# Patient Record
Sex: MALE | Race: ASIAN | NOT HISPANIC OR LATINO | Employment: FULL TIME | URBAN - METROPOLITAN AREA
[De-identification: names, ages, dates, MRNs, and addresses within clinical notes are randomized per-mention and may not be internally consistent; named-entity substitution may affect disease eponyms.]

---

## 2023-12-14 ENCOUNTER — APPOINTMENT (OUTPATIENT)
Dept: CT IMAGING | Facility: HOSPITAL | Age: 48
End: 2023-12-14
Payer: MEDICAID

## 2023-12-14 ENCOUNTER — HOSPITAL ENCOUNTER (EMERGENCY)
Facility: HOSPITAL | Age: 48
Discharge: HOME | End: 2023-12-14
Attending: EMERGENCY MEDICINE | Admitting: EMERGENCY MEDICINE
Payer: MEDICAID

## 2023-12-14 VITALS
OXYGEN SATURATION: 94 % | WEIGHT: 186 LBS | DIASTOLIC BLOOD PRESSURE: 96 MMHG | TEMPERATURE: 98.6 F | HEART RATE: 73 BPM | RESPIRATION RATE: 28 BRPM | SYSTOLIC BLOOD PRESSURE: 151 MMHG

## 2023-12-14 DIAGNOSIS — R07.9 CHEST PAIN: ICD-10-CM

## 2023-12-14 LAB
ALBUMIN SERPL BCG-MCNC: 4.7 G/DL (ref 3.5–5.2)
ALP SERPL-CCNC: 108 U/L (ref 40–150)
ALT SERPL W P-5'-P-CCNC: 29 U/L (ref 0–70)
ANION GAP SERPL CALCULATED.3IONS-SCNC: 9 MMOL/L (ref 7–15)
AST SERPL W P-5'-P-CCNC: 20 U/L (ref 0–45)
ATRIAL RATE - MUSE: 82 BPM
BASOPHILS # BLD AUTO: 0 10E3/UL (ref 0–0.2)
BASOPHILS NFR BLD AUTO: 0 %
BILIRUB DIRECT SERPL-MCNC: <0.2 MG/DL (ref 0–0.3)
BILIRUB SERPL-MCNC: 0.5 MG/DL
BUN SERPL-MCNC: 15.7 MG/DL (ref 6–20)
CALCIUM SERPL-MCNC: 9 MG/DL (ref 8.6–10)
CHLORIDE SERPL-SCNC: 104 MMOL/L (ref 98–107)
CREAT SERPL-MCNC: 0.85 MG/DL (ref 0.67–1.17)
D DIMER PPP FEU-MCNC: <0.27 UG/ML FEU (ref 0–0.5)
DEPRECATED HCO3 PLAS-SCNC: 27 MMOL/L (ref 22–29)
DIASTOLIC BLOOD PRESSURE - MUSE: NORMAL MMHG
EGFRCR SERPLBLD CKD-EPI 2021: >90 ML/MIN/1.73M2
EOSINOPHIL # BLD AUTO: 0.1 10E3/UL (ref 0–0.7)
EOSINOPHIL NFR BLD AUTO: 1 %
ERYTHROCYTE [DISTWIDTH] IN BLOOD BY AUTOMATED COUNT: 12.2 % (ref 10–15)
GLUCOSE SERPL-MCNC: 114 MG/DL (ref 70–99)
HCT VFR BLD AUTO: 54.9 % (ref 40–53)
HGB BLD-MCNC: 18.1 G/DL (ref 13.3–17.7)
HOLD SPECIMEN: NORMAL
HOLD SPECIMEN: NORMAL
IMM GRANULOCYTES # BLD: 0 10E3/UL
IMM GRANULOCYTES NFR BLD: 0 %
INTERPRETATION ECG - MUSE: NORMAL
LYMPHOCYTES # BLD AUTO: 2.1 10E3/UL (ref 0.8–5.3)
LYMPHOCYTES NFR BLD AUTO: 26 %
MCH RBC QN AUTO: 30.1 PG (ref 26.5–33)
MCHC RBC AUTO-ENTMCNC: 33 G/DL (ref 31.5–36.5)
MCV RBC AUTO: 91 FL (ref 78–100)
MONOCYTES # BLD AUTO: 0.5 10E3/UL (ref 0–1.3)
MONOCYTES NFR BLD AUTO: 6 %
NEUTROPHILS # BLD AUTO: 5.2 10E3/UL (ref 1.6–8.3)
NEUTROPHILS NFR BLD AUTO: 67 %
NRBC # BLD AUTO: 0 10E3/UL
NRBC BLD AUTO-RTO: 0 /100
P AXIS - MUSE: 47 DEGREES
PLATELET # BLD AUTO: 337 10E3/UL (ref 150–450)
POTASSIUM SERPL-SCNC: 3.9 MMOL/L (ref 3.4–5.3)
PR INTERVAL - MUSE: 182 MS
PROT SERPL-MCNC: 7.4 G/DL (ref 6.4–8.3)
QRS DURATION - MUSE: 94 MS
QT - MUSE: 374 MS
QTC - MUSE: 436 MS
R AXIS - MUSE: 51 DEGREES
RBC # BLD AUTO: 6.01 10E6/UL (ref 4.4–5.9)
SODIUM SERPL-SCNC: 140 MMOL/L (ref 135–145)
SYSTOLIC BLOOD PRESSURE - MUSE: NORMAL MMHG
T AXIS - MUSE: 38 DEGREES
TROPONIN T SERPL HS-MCNC: 6 NG/L
VENTRICULAR RATE- MUSE: 82 BPM
WBC # BLD AUTO: 7.8 10E3/UL (ref 4–11)

## 2023-12-14 PROCEDURE — 250N000011 HC RX IP 250 OP 636

## 2023-12-14 PROCEDURE — 71275 CT ANGIOGRAPHY CHEST: CPT

## 2023-12-14 PROCEDURE — 93005 ELECTROCARDIOGRAM TRACING: CPT | Performed by: STUDENT IN AN ORGANIZED HEALTH CARE EDUCATION/TRAINING PROGRAM

## 2023-12-14 PROCEDURE — 80053 COMPREHEN METABOLIC PANEL: CPT

## 2023-12-14 PROCEDURE — 36415 COLL VENOUS BLD VENIPUNCTURE: CPT

## 2023-12-14 PROCEDURE — 84484 ASSAY OF TROPONIN QUANT: CPT

## 2023-12-14 PROCEDURE — 250N000011 HC RX IP 250 OP 636: Mod: JZ | Performed by: EMERGENCY MEDICINE

## 2023-12-14 PROCEDURE — 93005 ELECTROCARDIOGRAM TRACING: CPT | Performed by: EMERGENCY MEDICINE

## 2023-12-14 PROCEDURE — 96374 THER/PROPH/DIAG INJ IV PUSH: CPT

## 2023-12-14 PROCEDURE — 85379 FIBRIN DEGRADATION QUANT: CPT

## 2023-12-14 PROCEDURE — 99285 EMERGENCY DEPT VISIT HI MDM: CPT | Mod: 25

## 2023-12-14 PROCEDURE — 85025 COMPLETE CBC W/AUTO DIFF WBC: CPT

## 2023-12-14 RX ORDER — IOPAMIDOL 755 MG/ML
90 INJECTION, SOLUTION INTRAVASCULAR ONCE
Status: COMPLETED | OUTPATIENT
Start: 2023-12-14 | End: 2023-12-14

## 2023-12-14 RX ORDER — HYDROMORPHONE HYDROCHLORIDE 1 MG/ML
0.5 INJECTION, SOLUTION INTRAMUSCULAR; INTRAVENOUS; SUBCUTANEOUS ONCE
Status: COMPLETED | OUTPATIENT
Start: 2023-12-14 | End: 2023-12-14

## 2023-12-14 RX ADMIN — IOPAMIDOL 90 ML: 755 INJECTION, SOLUTION INTRAVENOUS at 21:15

## 2023-12-14 RX ADMIN — HYDROMORPHONE HYDROCHLORIDE 0.5 MG: 1 INJECTION, SOLUTION INTRAMUSCULAR; INTRAVENOUS; SUBCUTANEOUS at 20:18

## 2023-12-14 ASSESSMENT — ACTIVITIES OF DAILY LIVING (ADL): ADLS_ACUITY_SCORE: 35

## 2023-12-15 NOTE — ED NOTES
Pt discharged from ED to home ambulatory with family. Patient verbalized understanding of discharge instructions and recommended follow up care as noted on discharge instructions.  Written discharge instructions given, denies any further questions. Pt agreeable to discharge plan.

## 2023-12-15 NOTE — ED PROVIDER NOTES
Emergency Department Midlevel Supervisory Note     I personally saw the patient and performed a substantive portion of the visit including all aspects of the medical decision making.    ED Course:  8:03 PM Johanna Lawson PA-C staffed patient with me. I agree with their assessment and plan of management, and I will see the patient.  8:14 PM I met with the patient to introduce myself, gather additional history, perform my initial exam, and discuss the plan.     Brief HPI:     Carson Cook is a 48 year old male who presents for evaluation of chest pain.     The patient endorses chest pain since 4am this morning that is constant. Pain worsens with deep breaths or using left arm and reproducible on exam. Hasn't taken anything. No hx similar symptoms. It doesn't radiate anywhere. No hx heart disease. He allso endorses left arm numbness/tingling and left leg numbness/tingling on and off over the day.           I, Little Ceja, am serving as a scribe to document services personally performed by Dr. Edwards, based on my observations and the provider's statements to me.   I, Lavinia Edwards MD attest that Little Ceja was acting in a scribe capacity, has observed my performance of the services and has documented them in accordance with my direction.    Brief Physical Exam: BP (!) 197/112   Pulse 83   Temp 98.6  F (37  C) (Oral)   Resp 20   Wt 84.4 kg (186 lb)   SpO2 97%   Constitutional:  Alert, in no acute distress         MDM:  48-year-old male smoker who is hypertensive here with chest pain radiating into his back.  It is also pleuritic but also reproducible on exam and worse with palpation and movement of his arm.  Clinically, we suspect a musculoskeletal source but did consider the idea of a dissection, pulmonary embolus, ACS given his risk factors and presentation.  His EKG is nonischemic, troponin is negative, and with ongoing symptoms for greater than 12 hours I feel this adequately rules out ACS.   Fortunately, a dissection study is negative for dissection or PE.  He is still hypertensive, but it turns out he has not been doing a good job at taking his blood pressure medications.  At this time, it seems reassuring and we will discharge with close follow-up with rapid access cardiology and return precautions.       1. Chest pain        Labs and Imaging:  Results for orders placed or performed during the hospital encounter of 12/14/23   CBC with platelets and differential   Result Value Ref Range    WBC Count 7.8 4.0 - 11.0 10e3/uL    RBC Count 6.01 (H) 4.40 - 5.90 10e6/uL    Hemoglobin 18.1 (H) 13.3 - 17.7 g/dL    Hematocrit 54.9 (H) 40.0 - 53.0 %    MCV 91 78 - 100 fL    MCH 30.1 26.5 - 33.0 pg    MCHC 33.0 31.5 - 36.5 g/dL    RDW 12.2 10.0 - 15.0 %    Platelet Count 337 150 - 450 10e3/uL    % Neutrophils 67 %    % Lymphocytes 26 %    % Monocytes 6 %    % Eosinophils 1 %    % Basophils 0 %    % Immature Granulocytes 0 %    NRBCs per 100 WBC 0 <1 /100    Absolute Neutrophils 5.2 1.6 - 8.3 10e3/uL    Absolute Lymphocytes 2.1 0.8 - 5.3 10e3/uL    Absolute Monocytes 0.5 0.0 - 1.3 10e3/uL    Absolute Eosinophils 0.1 0.0 - 0.7 10e3/uL    Absolute Basophils 0.0 0.0 - 0.2 10e3/uL    Absolute Immature Granulocytes 0.0 <=0.4 10e3/uL    Absolute NRBCs 0.0 10e3/uL     I have reviewed the relevant laboratory and radiology studies    Procedures:  I was present for the key portions of procedures documented in midlevel note, see midlevel note for further details.    EKG: Personally reviewed and interpreted as documented below;    EKG reviewed interpreted by me shows sinus rhythm with a rate of 82, normal axis, QTc 436 with no acute ST or T wave changes no previous to compare    [facility] EMERGENCY DEPARTMENT  [address] [place] [id number] [phone]      Lavinia Edwards MD  12/14/23 7708

## 2023-12-15 NOTE — ED TRIAGE NOTES
The pt arrives with c/o of left sided chest pain that started around 4am this morning. The pain has been constant, and gets worse with deep breath.      Triage Assessment (Adult)       Row Name 12/14/23 1930          Triage Assessment    Airway WDL WDL        Cognitive/Neuro/Behavioral WDL    Cognitive/Neuro/Behavioral WDL WDL

## 2023-12-15 NOTE — ED PROVIDER NOTES
ED PROVIDER NOTE    EMERGENCY DEPARTMENT ENCOUNTER      NAME: Carson Cook  AGE: 48 year old male  YOB: 1975  MRN: [id number]  EVALUATION DATE & TIME: 12/14/2023  7:44 PM    PCP: No primary care provider on file.    ED PROVIDER: Johanna Lawson PA-C      Chief Complaint   Patient presents with    Chest Pain         FINAL IMPRESSION:  No diagnosis found.      MEDICAL DECISION MAKING:    Pertinent Labs & Imaging studies reviewed. (See chart for details)  Carson Cook is a 48 year old male with PMH of HTN who presents for evaluation of chest pain since 4am this morning that is constant. No hx heart disease. Denies SOB, fevers, nausea, vomiting, diarrhea, LE pain or swelling, back pain. Denies trauma or injury.  Current smoker 12 cigarettes a day. Denies recent travel or surgery or hospitalizations.Vitals 197/123. On exam diaphoretic, no respiratory distress, lungs are clear to auscultation.  Abdomen nontender.  No CVA tenderness.  Chest pain is left lateral wall and reproducible on exam and along rib cage.  Sensation intact throughout upper and lower extremities.  Strength 5 out of 5. PERRLA. EOMs intact.    Differential diagnosis includes but not limited to pulmonary embolism, aortic dissection, ACS, costochondritis, pericarditis, pneumonia, musculoskeletal chest pain, to name a few..   Emergency department work up included CTA chest/abdomen/pelvis with contrast, basic labs, and EKG. troponin normal.  EKG without ST elevation or T wave changes.  D-dimer normal.  Patient was given Dilaudid for symptoms. Given normal high-sensitivity troponin and normal EKG without evidence of ACS, pericarditis, or any other arrhythmia, and given chest pain has been going on since 4 AM, this essentially rules out ACS.  Given paresthesias in the upper and lower extremities on the left, chest pain, and blood pressures 200/120, ordered CTA to rule out dissection which was normal.   Also no evidence of pneumonia, effusion, or any  other findings that would contribute to his symptoms today.  Pain was reproducible on exam and worse with deep breaths.  This is consistent with either costochondritis or musculoskeletal type chest pain.  I will still put in a referral for rapid access cardiology to get close follow-up.  Also recommending ibuprofen and heating pad for the area.  Patient is agreeable to the plan.      At the conclusion of the encounter I discussed the results of all of the tests and the disposition. The questions were answered. The patient or family acknowledged understanding and was agreeable with the care plan. Patient was discharged in stable condition but instructed to return to the emergency department with any new or worsening of symptoms. Patient expressed understanding, feels comfortable, and is in agreement with this plan. All questions addressed prior to discharge.    History:  Supplemental history from: Documented in chart, if applicable  External Record(s) reviewed: Documented in chart, if applicable.    Work Up:  Chart documentation includes differential considered and any EKGs or imaging independently interpreted by provider, where specified.  In additional to work up documented, I considered the following work up: Documented in chart, if applicable.    External consultation:  Discussion of management with another provider: Documented in chart, if applicable and Hospitalist    Complicating factors:  Care impacted by chronic illness: Hypertension  Care affected by social determinants of health: N/A    Disposition considerations: Discharge. No recommendations on prescription strength medication(s). See documentation for any additional details.      ED COURSE:  1930 I met and introduced myself to the patient. I gathered initial history and performed my physical exam. We discussed plan for initial workup.   1945 I have staffed the patient with Dr. Lavinia Edwards, ED MD, who has evaluated the patient and agrees with all  aspects of today's care.   2130 We discussed the plan for discharge and the patient is agreeable. Reviewed supportive cares, symptomatic treatment, outpatient follow up, and reasons to return to the Emergency Department. Patient to be discharged by ED RN.             MEDICATIONS GIVEN IN THE EMERGENCY:  Medications - No data to display    NEW PRESCRIPTIONS STARTED AT TODAY'S ER VISIT  New Prescriptions    No medications on file          =================================================================    HPI    Patient information was obtained from: patient   Use of Intrepreter: N/A     Carson Cook is a 48 year old male with PMH of HTN who presents for evaluation of chest pain since 4am this morning that is constant. Worse with deep breaths or using left arm and reproducible on exam. Hasn't taken anything. No hx similar symptoms. It doesn't radiate anywhere. Also endorses left arm numbness/tingling and left leg numbness/tingling on and off over the day. Denies SOB, fevers, nausea, vomiting, diarrhea, LE pain or swelling, back pain. Denies trauma or injury. Denies hx heart disease or VTE.  Current smoker 12 cigarettes a day. Denies recent travel or surgery or hospitalizations.       REVIEW OF SYSTEMS   See HPI, otherwise all other systems reviewed and are negative    PAST MEDICAL HISTORY:  No past medical history on file.    PAST SURGICAL HISTORY:  No past surgical history on file.        CURRENT MEDICATIONS:    No current facility-administered medications for this encounter.  No current outpatient medications on file.    ALLERGIES:  No Known Allergies    FAMILY HISTORY:  No family history on file.      VITALS:  Vitals:    12/14/23 1929   BP: (!) 197/123   Pulse: 84   Resp: 18   Temp: 98.6  F (37  C)   TempSrc: Oral   SpO2: 98%   Weight: 84.4 kg (186 lb)       PHYSICAL EXAM      First Vitals:  Patient Vitals for the past 24 hrs:   BP Temp Temp src Pulse Resp SpO2 Weight   12/14/23 1929 (!) 197/123 98.6  F (37  C) Oral  84 18 98 % 84.4 kg (186 lb)       Physical Exam  Constitutional:       General: He is not in acute distress.     Appearance: He is diaphoretic. He is not ill-appearing.   Cardiovascular:      Rate and Rhythm: Normal rate and regular rhythm.      Pulses:           Radial pulses are 2+ on the right side and 2+ on the left side.      Heart sounds: Normal heart sounds.   Pulmonary:      Effort: No tachypnea or respiratory distress.      Breath sounds: Normal breath sounds. No decreased breath sounds, wheezing or rhonchi.   Chest:      Chest wall: Tenderness present. No crepitus.   Abdominal:      Palpations: Abdomen is soft.      Tenderness: There is no abdominal tenderness. There is no guarding.   Musculoskeletal:      Right lower leg: No tenderness. No edema.      Left lower leg: No tenderness. No edema.   Skin:     General: Skin is warm.      Capillary Refill: Capillary refill takes less than 2 seconds.   Neurological:      General: No focal deficit present.      Mental Status: He is alert.   Psychiatric:         Mood and Affect: Mood is anxious.           General Appearance:  Alert, cooperative, no distress, appears stated age  HENT: Normocephalic without obvious deformity, atraumatic. Mucous membranes moist   Eyes: Conjunctiva clear, Lids normal. No discharge.   Respiratory: No distress. Lungs clear to ausculation bilaterally. No wheezes, rhonchi or stridor  Cardiovascular: Regular rate and rhythm, no murmur. Normal cap refill. No peripheral edema  GI: Abdomen soft, nontender, normal bowel sounds  : No CVA tenderness  Musculoskeletal: Moving all extremities. No gross deformities  Integument: Warm, dry, no rashes or lesions  Neurologic: Alert and orientated x3. No focal deficits.  Psych: Normal mood and affect        LAB:  Labs Ordered and Resulted from Time of ED Arrival to Time of ED Departure - No data to display    RADIOLOGY:  XR Chest 2 Views    (Results Pending)       EKG:    Performed at: 1923  Impression:  normal sinus rhythm with possible left atrial enlargement  Dr. Lavinia Edwards and I have independently reviewed and interpreted the EKG(s) documented above.      Johanna Lawson PA-C  Emergency Medicine   [facility] EMERGENCY DEPARTMENT             Johanna Lawson PA-C  12/14/23 7206

## 2023-12-15 NOTE — DISCHARGE INSTRUCTIONS
You were seen in the ER for chest pain. We did an EKG that showed no evidence of an irregular rhythm, heart attack, or inflammation of your heart. We did a troponin which is the heart enzyme which was normal. We did a test for blood clots known as a D Dimer which was normal. We did a scan of your vessels and lungs to be sure nothing more was going on which was normal. At this point, we do not have a known cause for your pain. You can take tylenol 1000mg up to 4 times per day or ibuprofen 600mg up to 3 times per day. You can also use a heating pad on the area to help with the pain. I put in a referral for cardiology. They will call you to schedule an appointment. If you do not hear from them within 2 days, call the number on this document below. Please return to the ER if you area having any worsening chest pain, difficulty breathing, fevers, vomiting, or any other concerning symptoms.

## 2024-11-01 ENCOUNTER — HOSPITAL ENCOUNTER (EMERGENCY)
Facility: HOSPITAL | Age: 49
Discharge: HOME | End: 2024-11-01
Attending: STUDENT IN AN ORGANIZED HEALTH CARE EDUCATION/TRAINING PROGRAM | Admitting: STUDENT IN AN ORGANIZED HEALTH CARE EDUCATION/TRAINING PROGRAM

## 2024-11-01 VITALS
WEIGHT: 166 LBS | OXYGEN SATURATION: 98 % | RESPIRATION RATE: 18 BRPM | DIASTOLIC BLOOD PRESSURE: 108 MMHG | TEMPERATURE: 99 F | SYSTOLIC BLOOD PRESSURE: 183 MMHG | BODY MASS INDEX: 29.41 KG/M2 | HEART RATE: 75 BPM

## 2024-11-01 DIAGNOSIS — I10 HYPERTENSION, UNSPECIFIED TYPE: ICD-10-CM

## 2024-11-01 PROCEDURE — 99282 EMERGENCY DEPT VISIT SF MDM: CPT

## 2024-11-01 ASSESSMENT — COLUMBIA-SUICIDE SEVERITY RATING SCALE - C-SSRS
6. HAVE YOU EVER DONE ANYTHING, STARTED TO DO ANYTHING, OR PREPARED TO DO ANYTHING TO END YOUR LIFE?: NO
1. IN THE PAST MONTH, HAVE YOU WISHED YOU WERE DEAD OR WISHED YOU COULD GO TO SLEEP AND NOT WAKE UP?: NO
2. HAVE YOU ACTUALLY HAD ANY THOUGHTS OF KILLING YOURSELF IN THE PAST MONTH?: NO

## 2024-11-01 ASSESSMENT — ACTIVITIES OF DAILY LIVING (ADL): ADLS_ACUITY_SCORE: 0

## 2024-11-01 NOTE — ED PROVIDER NOTES
EMERGENCY DEPARTMENT ENCOUNTER      NAME: Carson Cook  AGE: 49 year old male  YOB: 1975  MRN: [id number]  EVALUATION DATE & TIME: No admission date for patient encounter.    PCP: No Ref-Primary, Physician    ED PROVIDER: Leon Milner MD      Chief Complaint   Patient presents with    Hypertension         FINAL IMPRESSION:  1. Hypertension, unspecified type          ED COURSE & MEDICAL DECISION MAKING:    Pertinent Labs & Imaging studies reviewed. (See chart for details)  49 year old male presents to the Emergency Department for evaluation of asymptomatic hypertension    ED Course as of 11/01/24 1625   Fri Nov 01, 2024   1613 Patient is a 49-year-old male with present to the emergency department after he was referred from his eye doctor for asymptomatic hypertension.  To the eye doctor because he had an itchy spot on his upper eyelid and was noted to be hypertensive there were systolic of 180.  However he denies any symptoms of malignant hypertension.  Denies any headache, neck pain, chest pain shortness of breath or abdominal pain.  No eye pain.  No changes in vision.No numbness or weakness in the arms or legs.He has been told he is had had blood pressure in the past was not currently taking any antihypertensive medications.    Exam here patient is notably hypertensive but saturating well on room air and normal heart rate.  Lungs are clear without any wheezes or rales.  Abdomen soft and benign.  Normal speech no facial droop, symmetric strength in upper lower extremities bilaterally.    Patient's presenting with asymptomatic hypertension does not require acute blood pressure lowering further emergent workup at this point in time.  Recommend following up with a primary care doctor to be initiated on blood pressure medications going forward if he is persistently hypertensive.  Will put a referral for primary care follow-up.  Patient comfortable this plan was discharged stable condition         Medical  "Decision Making  Obtained supplemental history:Supplemental history obtained?: No  Reviewed external records: External records reviewed?: No  Care impacted by chronic illness:Documented in Chart  Did you consider but not order tests?: Work up considered but not performed and documented in chart, if applicable  Did you interpret images independently?: Independent interpretation of ECG and images noted in documentation, when applicable.  Consultation discussion with other provider:Did you involve another provider (consultant, , pharmacy, etc.)?: No  Discharge. No recommendations on prescription strength medication(s). N/A.    MIPS:            At the conclusion of the encounter I discussed the results of all of the tests and the disposition. The questions were answered. The patient or family acknowledged understanding and was agreeable with the care plan.     0 minutes of critical care time     MEDICATIONS GIVEN IN THE EMERGENCY:  Medications - No data to display    NEW PRESCRIPTIONS STARTED AT TODAY'S ER VISIT  New Prescriptions    No medications on file          =================================================================    [facility] [place]    Patient information was obtained from: patient     Use of : N/A       Carson Cook is a 49 year old male with a pertinent history of chest pain and high blood pressure, who presents to this ED by by walk in for evaluation of hypertension.     The patient was at an eye appointment for itchiness of his left eye. They took his blood pressure and it read 183 systolic, and he was instructed that this was very serious and that he could \"die at anytime.\" This prompted his visit to the emergency department. Of note, he has been told he has high blood pressure, but does not see a regular provider.     Patient denies symptoms of chest pain, shortness of breath, or headache. No eye pain.       REVIEW OF SYSTEMS   Refer to the [facility] [place]    PAST MEDICAL HISTORY:  No past medical history on " file.    PAST SURGICAL HISTORY:  No past surgical history on file.        CURRENT MEDICATIONS:    No current outpatient medications on file.      ALLERGIES:  No Known Allergies    FAMILY HISTORY:  No family history on file.    SOCIAL HISTORY:   Social History     Socioeconomic History    Marital status: Single   Social History Narrative    ** Merged History Encounter **            VITALS:  BP (!) 183/108   Pulse 75   Temp 99  F (37.2  C)   Resp 18   Wt 75.3 kg (166 lb)   SpO2 98%   BMI 29.41 kg/m      PHYSICAL EXAM    Constitutional: Well developed, Well nourished, NAD,  HENT: Normocephalic, Atraumatic,  mucous membranes moist,   Neck- trachea midline, No stridor.    Eyes:EOMI, Conjunctiva normal, No discharge. Patch of irritation of left upper eye lid, no conjunctival injection.   Respiratory: Normal breath sounds, No respiratory distress, No wheezing.    Cardiovascular: Normal heart rate, Regular rhythm,  No murmurs,   Abdominal: Soft, No tenderness, No rebound or guarding.     Musculoskeletal: no deformity or malalignment   Integument: Warm, Dry, No erythema,    Neurologic: Alert & oriented x 3  , Symmetric strength in upper lower extremities bilaterally  Psychiatric: Affect normal, Cooperative.      LAB:  All pertinent labs reviewed and interpreted.       RADIOLOGY:  Reviewed all pertinent imaging. Please see official radiology report.  No orders to display                I, Little Ceja, am serving as a scribe to document services personally performed by Leon Milner MD based on my observation and the provider's statements to me. ILeon MD, attest that Little Marcial is acting in a scribe capacity, has observed my performance of the services and has documented them in accordance with my direction.    Leon Milner MD  [facility] EMERGENCY DEPARTMENT  [address] [place] [id number]  [phone]      Leon Milner MD  11/01/24 5745

## 2024-11-01 NOTE — DISCHARGE INSTRUCTIONS
Your blood pressure is high today but we do not need to treat this emergently.  Will be important for you to follow-up with a primary care doctor as persistently elevated blood pressure can certainly increase your risk of heart attack and stroke in the long-term.  Referral for primary care has been placed for you and they will likely contact you in the next few days to try to schedule a follow-up appointment.  Otherwise if you develop sudden onset headache, significant chest pain or shortness of breath or other concerning symptom please return to the emergency department for repeat evaluation.

## 2024-11-01 NOTE — ED TRIAGE NOTES
Pt. Was just at an ophthalmologist BP was noted to be high. Sent to ED for further eval. He does not have a PMD BP in triage 177/103     Triage Assessment (Adult)       Row Name 11/01/24 1530          Triage Assessment    Airway WDL WDL        Respiratory WDL    Respiratory WDL WDL        Skin Circulation/Temperature WDL    Skin Circulation/Temperature WDL WDL        Cardiac WDL    Cardiac WDL WDL        Peripheral/Neurovascular WDL    Peripheral Neurovascular WDL WDL        Cognitive/Neuro/Behavioral WDL    Cognitive/Neuro/Behavioral WDL WDL

## 2025-01-19 ENCOUNTER — APPOINTMENT (OUTPATIENT)
Dept: CT IMAGING | Facility: HOSPITAL | Age: 50
End: 2025-01-19
Attending: EMERGENCY MEDICINE
Payer: COMMERCIAL

## 2025-01-19 ENCOUNTER — HOSPITAL ENCOUNTER (EMERGENCY)
Facility: HOSPITAL | Age: 50
Discharge: HOME | End: 2025-01-19
Admitting: EMERGENCY MEDICINE
Payer: COMMERCIAL

## 2025-01-19 VITALS
BODY MASS INDEX: 28.7 KG/M2 | RESPIRATION RATE: 24 BRPM | HEIGHT: 63 IN | HEART RATE: 77 BPM | TEMPERATURE: 98.1 F | WEIGHT: 162 LBS | DIASTOLIC BLOOD PRESSURE: 86 MMHG | SYSTOLIC BLOOD PRESSURE: 127 MMHG | OXYGEN SATURATION: 97 %

## 2025-01-19 DIAGNOSIS — N20.0 KIDNEY STONE: ICD-10-CM

## 2025-01-19 DIAGNOSIS — N39.0 UTI (URINARY TRACT INFECTION): ICD-10-CM

## 2025-01-19 LAB
ALBUMIN UR-MCNC: 30 MG/DL
ANION GAP SERPL CALCULATED.3IONS-SCNC: 7 MMOL/L (ref 7–15)
APPEARANCE UR: CLEAR
BASOPHILS # BLD AUTO: 0 10E3/UL (ref 0–0.2)
BASOPHILS NFR BLD AUTO: 0 %
BILIRUB UR QL STRIP: NEGATIVE
BUN SERPL-MCNC: 15.5 MG/DL (ref 6–20)
CALCIUM SERPL-MCNC: 9.1 MG/DL (ref 8.8–10.4)
CHLORIDE SERPL-SCNC: 105 MMOL/L (ref 98–107)
COLOR UR AUTO: YELLOW
CREAT SERPL-MCNC: 0.99 MG/DL (ref 0.67–1.17)
EGFRCR SERPLBLD CKD-EPI 2021: >90 ML/MIN/1.73M2
EOSINOPHIL # BLD AUTO: 0.1 10E3/UL (ref 0–0.7)
EOSINOPHIL NFR BLD AUTO: 2 %
ERYTHROCYTE [DISTWIDTH] IN BLOOD BY AUTOMATED COUNT: 12.5 % (ref 10–15)
GLUCOSE SERPL-MCNC: 189 MG/DL (ref 70–99)
GLUCOSE UR STRIP-MCNC: 30 MG/DL
HCO3 SERPL-SCNC: 30 MMOL/L (ref 22–29)
HCT VFR BLD AUTO: 50.7 % (ref 40–53)
HGB BLD-MCNC: 16.9 G/DL (ref 13.3–17.7)
HGB UR QL STRIP: NEGATIVE
HOLD SPECIMEN: NORMAL
HOLD SPECIMEN: NORMAL
HYALINE CASTS: 1 /LPF
IMM GRANULOCYTES # BLD: 0 10E3/UL
IMM GRANULOCYTES NFR BLD: 0 %
KETONES UR STRIP-MCNC: ABNORMAL MG/DL
LEUKOCYTE ESTERASE UR QL STRIP: ABNORMAL
LYMPHOCYTES # BLD AUTO: 1.4 10E3/UL (ref 0.8–5.3)
LYMPHOCYTES NFR BLD AUTO: 21 %
MCH RBC QN AUTO: 31 PG (ref 26.5–33)
MCHC RBC AUTO-ENTMCNC: 33.3 G/DL (ref 31.5–36.5)
MCV RBC AUTO: 93 FL (ref 78–100)
MONOCYTES # BLD AUTO: 0.4 10E3/UL (ref 0–1.3)
MONOCYTES NFR BLD AUTO: 6 %
MUCOUS THREADS #/AREA URNS LPF: PRESENT /LPF
NEUTROPHILS # BLD AUTO: 4.9 10E3/UL (ref 1.6–8.3)
NEUTROPHILS NFR BLD AUTO: 72 %
NITRATE UR QL: NEGATIVE
NRBC # BLD AUTO: 0 10E3/UL
NRBC BLD AUTO-RTO: 0 /100
PH UR STRIP: 6 [PH] (ref 5–7)
PLATELET # BLD AUTO: 295 10E3/UL (ref 150–450)
POTASSIUM SERPL-SCNC: 3.6 MMOL/L (ref 3.4–5.3)
RBC # BLD AUTO: 5.46 10E6/UL (ref 4.4–5.9)
RBC URINE: 2 /HPF
SODIUM SERPL-SCNC: 142 MMOL/L (ref 135–145)
SP GR UR STRIP: 1.03 (ref 1–1.03)
SQUAMOUS EPITHELIAL: 1 /HPF
UROBILINOGEN UR STRIP-MCNC: 2 MG/DL
WBC # BLD AUTO: 6.8 10E3/UL (ref 4–11)
WBC URINE: 9 /HPF

## 2025-01-19 PROCEDURE — 99285 EMERGENCY DEPT VISIT HI MDM: CPT | Mod: 25

## 2025-01-19 PROCEDURE — 96374 THER/PROPH/DIAG INJ IV PUSH: CPT

## 2025-01-19 PROCEDURE — 85004 AUTOMATED DIFF WBC COUNT: CPT | Performed by: EMERGENCY MEDICINE

## 2025-01-19 PROCEDURE — 87086 URINE CULTURE/COLONY COUNT: CPT | Performed by: EMERGENCY MEDICINE

## 2025-01-19 PROCEDURE — 84520 ASSAY OF UREA NITROGEN: CPT | Performed by: EMERGENCY MEDICINE

## 2025-01-19 PROCEDURE — 250N000011 HC RX IP 250 OP 636: Performed by: EMERGENCY MEDICINE

## 2025-01-19 PROCEDURE — 80048 BASIC METABOLIC PNL TOTAL CA: CPT | Performed by: EMERGENCY MEDICINE

## 2025-01-19 PROCEDURE — 74176 CT ABD & PELVIS W/O CONTRAST: CPT

## 2025-01-19 PROCEDURE — 36415 COLL VENOUS BLD VENIPUNCTURE: CPT | Performed by: EMERGENCY MEDICINE

## 2025-01-19 PROCEDURE — 81001 URINALYSIS AUTO W/SCOPE: CPT | Performed by: EMERGENCY MEDICINE

## 2025-01-19 RX ORDER — KETOROLAC TROMETHAMINE 15 MG/ML
15 INJECTION, SOLUTION INTRAMUSCULAR; INTRAVENOUS ONCE
Status: COMPLETED | OUTPATIENT
Start: 2025-01-19 | End: 2025-01-19

## 2025-01-19 RX ORDER — CEPHALEXIN 500 MG/1
500 CAPSULE ORAL 4 TIMES DAILY
Qty: 28 CAPSULE | Refills: 0 | Status: SHIPPED | OUTPATIENT
Start: 2025-01-19 | End: 2025-01-26

## 2025-01-19 RX ADMIN — KETOROLAC TROMETHAMINE 15 MG: 15 INJECTION, SOLUTION INTRAMUSCULAR; INTRAVENOUS at 14:32

## 2025-01-19 ASSESSMENT — ACTIVITIES OF DAILY LIVING (ADL)
ADLS_ACUITY_SCORE: 41
ADLS_ACUITY_SCORE: 41

## 2025-01-19 NOTE — DISCHARGE INSTRUCTIONS
You are seen and evaluated here in the emergency department for your right flank pain and urinary symptoms.  You may have a UTI and will treat with antibiotics.  Kidney stone on the left I do not think this is causing symptoms as it is not actively passing.  Do think symptoms might be musculoskeletal and recommend Tylenol, ibuprofen and lidocaine patches which are over-the-counter.  Significant chest pain, difficulty breathing, abdominal pain, vomiting, fevers or other concerns please return.  Otherwise, close follow-up with primary care.

## 2025-01-19 NOTE — ED TRIAGE NOTES
"Patient arrives to triage from home with chief complaint of flank pain for the past couple of days.  When asked which side was hurting patient stated \"It's kind of hard to tell right now.\"  Endorses blood in urine and burning sensation as well.  Alert and oriented x4.         "

## 2025-01-19 NOTE — ED PROVIDER NOTES
EMERGENCY DEPARTMENT ENCOUNTER      NAME: Carson Cook  AGE: 49 year old male  YOB: 1975  MRN: [id number]  EVALUATION DATE & TIME: 1/19/2025  2:14 PM    PCP: No Ref-Primary, Physician    ED PROVIDER: Susanna Ledesma PA-C      Chief Complaint   Patient presents with    Flank Pain         FINAL IMPRESSION:  1. UTI (urinary tract infection)    2. Kidney stone          MEDICAL DECISION MAKING:    Pertinent Labs & Imaging studies reviewed. (See chart for details)  49 year old male presents to the Emergency Department for evaluation of flank pain and urinary symptoms.  On my evaluation, patient was initially hypertensive at 161/97 but otherwise vitally stable.  Examination with abdomen that is soft, nontender without any rebound or guarding.  No CVA tenderness.  Heart regular rate and rhythm lungs are auscultation bilaterally.  Differential diagnosis included kidney stone, UTI, other intra-abdominal pathology.    UA with mild leukocyte esterase and some white blood cells at 9 but no blood.  With his urinary symptoms we will treat for UTI.  Patient with kidney stone on the left which is 2 mm and nonobstructing and not passing currently and is up in the kidney.  Do not feel this is kidney stone complicated by UTI and feel that admission required.  He is well-appearing without elevated white blood cell count and do feel his symptoms of right flank pain are likely musculoskeletal in nature as it is mostly when he is bending or twisting that he notices this intermittent sharp pain.  Patient reassured by findings here.  Will treat with oral antibiotics for possible UTI and urine culture sent and pending.  He will follow-up closely with primary care and this was discussed with him.  Reasons to return to the emergency department were discussed and questions answered to the best my ability.  He was discharged home in stable condition.    Medical Decision Making  Obtained supplemental history:Supplemental history  obtained?: No  Reviewed external records: External records reviewed?: No  Care impacted by chronic illness:Documented in Chart  Did you consider but not order tests?: Work up considered but not performed and documented in chart, if applicable  Did you interpret images independently?: Independent interpretation of ECG and images noted in documentation, when applicable.  Consultation discussion with other provider:Did you involve another provider (consultant, , pharmacy, etc.)?: No  Discharge. I prescribed additional prescription strength medication(s) as charted. See documentation for any additional details.    MIPS: Not Applicable       ED COURSE:  2:40 PM I met with the patient, obtained history, performed an initial exam, and discussed options and plan for diagnostics and treatment here in the ED.    3:20 PM Patient discharged after being provided with extensive anticipatory guidance and given return precautions, importance of PCP follow-up emphasized.    At the conclusion of the encounter I discussed the results of all of the tests and the disposition. The questions were answered. The patient or family acknowledged understanding and was agreeable with the care plan.     MEDICATIONS GIVEN IN THE EMERGENCY:  Medications   ketorolac (TORADOL) injection 15 mg (15 mg Intravenous $Given 1/19/25 9204)       NEW PRESCRIPTIONS STARTED AT TODAY'S ER VISIT  New Prescriptions    CEPHALEXIN (KEFLEX) 500 MG CAPSULE    Take 1 capsule (500 mg) by mouth 4 times daily for 7 days.            =================================================================    HPI:    Patient information was obtained from: The patient    Use of Interpretor: N/A          Carson Cook is a 49 year old male with a pertinent history of kidney stones who presents to this ED via private vehicle for evaluation of right flank pain with concern for kidney stone.  The patient has been having intermittent right flank pain over the last few days with burning with  "urination and blood in his urine.  He was concerned and presented to the ER.  On my evaluation, patient reports pain worse with movement and bending over.  It is intermittent.  No significant pain at this time.  No fever, nausea, vomiting, diarrhea.  No other concerns voiced.      REVIEW OF SYSTEMS:  Negative unless otherwise stated in the above HPI.       PAST MEDICAL HISTORY:  No past medical history on file.    PAST SURGICAL HISTORY:  No past surgical history on file.        CURRENT MEDICATIONS:    No current facility-administered medications for this encounter.    Current Outpatient Medications:     cephALEXin (KEFLEX) 500 MG capsule, Take 1 capsule (500 mg) by mouth 4 times daily for 7 days., Disp: 28 capsule, Rfl: 0      ALLERGIES:  No Known Allergies    FAMILY HISTORY:  No family history on file.    SOCIAL HISTORY:   Social History     Socioeconomic History    Marital status: Single   Social History Narrative    ** Merged History Encounter **            VITALS:  Patient Vitals for the past 24 hrs:   BP Temp Temp src Pulse Resp SpO2 Height Weight   01/19/25 1417 (!) 142/86 -- -- 77 -- 97 % -- --   01/19/25 1412 (!) 161/97 98.1  F (36.7  C) Oral 71 24 100 % 1.6 m (5' 3\") 73.5 kg (162 lb)       PHYSICAL EXAM    Constitutional: Well developed, Well nourished, NAD  HENT: Normocephalic, Atraumatic, Bilateral external ears normal, Oropharynx normal, mucous membranes moist, Nose normal.   Neck: Normal range of motion, No tenderness, Supple, No stridor.  Eyes: Eyes track normally throughout exam, Conjunctiva normal, No discharge.   Respiratory: Normal breath sounds, No respiratory distress, No wheezing, Speaks full sentences easily. No cough.  Cardiovascular: Normal heart rate, Regular rhythm, No murmurs, No rubs, No gallops. Chest wall nontender.  GI: Soft, No tenderness, No masses, No flank tenderness. No rebound or guarding.  No CVA tenderness.  Musculoskeletal: Good range of motion in all major joints. "   Integument: Warm, Dry, No erythema, No rash. No petechiae.  Neurologic: Alert & oriented x 3, Normal motor function, Normal sensory function, No focal deficits noted. Normal gait.  Psychiatric: Affect normal, Judgment normal, Mood normal. Cooperative.    LAB:  All pertinent labs reviewed and interpreted.  Recent Results (from the past 24 hours)   Basic metabolic panel    Collection Time: 01/19/25  2:25 PM   Result Value Ref Range    Sodium 142 135 - 145 mmol/L    Potassium 3.6 3.4 - 5.3 mmol/L    Chloride 105 98 - 107 mmol/L    Carbon Dioxide (CO2) 30 (H) 22 - 29 mmol/L    Anion Gap 7 7 - 15 mmol/L    Urea Nitrogen 15.5 6.0 - 20.0 mg/dL    Creatinine 0.99 0.67 - 1.17 mg/dL    GFR Estimate >90 >60 mL/min/1.73m2    Calcium 9.1 8.8 - 10.4 mg/dL    Glucose 189 (H) 70 - 99 mg/dL   CBC with platelets and differential    Collection Time: 01/19/25  2:25 PM   Result Value Ref Range    WBC Count 6.8 4.0 - 11.0 10e3/uL    RBC Count 5.46 4.40 - 5.90 10e6/uL    Hemoglobin 16.9 13.3 - 17.7 g/dL    Hematocrit 50.7 40.0 - 53.0 %    MCV 93 78 - 100 fL    MCH 31.0 26.5 - 33.0 pg    MCHC 33.3 31.5 - 36.5 g/dL    RDW 12.5 10.0 - 15.0 %    Platelet Count 295 150 - 450 10e3/uL    % Neutrophils 72 %    % Lymphocytes 21 %    % Monocytes 6 %    % Eosinophils 2 %    % Basophils 0 %    % Immature Granulocytes 0 %    NRBCs per 100 WBC 0 <1 /100    Absolute Neutrophils 4.9 1.6 - 8.3 10e3/uL    Absolute Lymphocytes 1.4 0.8 - 5.3 10e3/uL    Absolute Monocytes 0.4 0.0 - 1.3 10e3/uL    Absolute Eosinophils 0.1 0.0 - 0.7 10e3/uL    Absolute Basophils 0.0 0.0 - 0.2 10e3/uL    Absolute Immature Granulocytes 0.0 <=0.4 10e3/uL    Absolute NRBCs 0.0 10e3/uL   Extra Blue Top Tube    Collection Time: 01/19/25  2:25 PM   Result Value Ref Range    Hold Specimen JIC    Extra Red Top Tube    Collection Time: 01/19/25  2:25 PM   Result Value Ref Range    Hold Specimen JIC    UA with Microscopic reflex to Culture    Collection Time: 01/19/25  2:32 PM     Specimen: Urine, Clean Catch   Result Value Ref Range    Color Urine Yellow Colorless, Straw, Light Yellow, Yellow    Appearance Urine Clear Clear    Glucose Urine 30 (A) Negative mg/dL    Bilirubin Urine Negative Negative    Ketones Urine Trace (A) Negative mg/dL    Specific Gravity Urine 1.030 1.001 - 1.030    Blood Urine Negative Negative    pH Urine 6.0 5.0 - 7.0    Protein Albumin Urine 30 (A) Negative mg/dL    Urobilinogen Urine 2.0 (A) <2.0 mg/dL    Nitrite Urine Negative Negative    Leukocyte Esterase Urine 25 Jose/uL (A) Negative    Mucus Urine Present (A) None Seen /LPF    RBC Urine 2 <=2 /HPF    WBC Urine 9 (H) <=5 /HPF    Squamous Epithelials Urine 1 <=1 /HPF    Hyaline Casts Urine 1 <=2 /LPF         RADIOLOGY:  Reviewed all pertinent imaging. Please see official radiology report.  CT Abdomen Pelvis w/o Contrast   Preliminary Result   IMPRESSION:    1.  2 mm nonobstructing left renal calculus. No hydronephrosis or ureteral calculus on either side.   2.  Stable benign left adrenal adenoma.             PROCEDURES:   None.     Susanna Ledesma PA-C  Emergency Medicine  [facility]  1/19/2025      Susanna Ledesma PA-C  01/19/25 1543

## 2025-01-20 LAB — BACTERIA UR CULT: NO GROWTH

## 2025-04-18 ENCOUNTER — APPOINTMENT (OUTPATIENT)
Dept: CT IMAGING | Facility: HOSPITAL | Age: 50
End: 2025-04-18
Attending: EMERGENCY MEDICINE
Payer: COMMERCIAL

## 2025-04-18 ENCOUNTER — HOSPITAL ENCOUNTER (EMERGENCY)
Facility: HOSPITAL | Age: 50
Discharge: HOME | End: 2025-04-18
Attending: EMERGENCY MEDICINE | Admitting: EMERGENCY MEDICINE
Payer: COMMERCIAL

## 2025-04-18 VITALS
WEIGHT: 154.9 LBS | DIASTOLIC BLOOD PRESSURE: 97 MMHG | RESPIRATION RATE: 16 BRPM | HEART RATE: 71 BPM | TEMPERATURE: 98.1 F | SYSTOLIC BLOOD PRESSURE: 159 MMHG | OXYGEN SATURATION: 97 % | BODY MASS INDEX: 27.44 KG/M2

## 2025-04-18 DIAGNOSIS — N20.1 URETEROLITHIASIS: ICD-10-CM

## 2025-04-18 DIAGNOSIS — R10.9 RIGHT FLANK PAIN: ICD-10-CM

## 2025-04-18 LAB
ALBUMIN UR-MCNC: 30 MG/DL
ANION GAP SERPL CALCULATED.3IONS-SCNC: 9 MMOL/L (ref 7–15)
APPEARANCE UR: ABNORMAL
BILIRUB UR QL STRIP: NEGATIVE
BUN SERPL-MCNC: 16.6 MG/DL (ref 6–20)
CALCIUM SERPL-MCNC: 8.5 MG/DL (ref 8.8–10.4)
CHLORIDE SERPL-SCNC: 100 MMOL/L (ref 98–107)
COLOR UR AUTO: YELLOW
CREAT SERPL-MCNC: 0.82 MG/DL (ref 0.67–1.17)
CRP SERPL-MCNC: <3 MG/L
EGFRCR SERPLBLD CKD-EPI 2021: >90 ML/MIN/1.73M2
ERYTHROCYTE [DISTWIDTH] IN BLOOD BY AUTOMATED COUNT: 12.8 % (ref 10–15)
GLUCOSE SERPL-MCNC: 147 MG/DL (ref 70–99)
GLUCOSE UR STRIP-MCNC: NEGATIVE MG/DL
HCO3 SERPL-SCNC: 28 MMOL/L (ref 22–29)
HCT VFR BLD AUTO: 51.2 % (ref 40–53)
HGB BLD-MCNC: 17.3 G/DL (ref 13.3–17.7)
HGB UR QL STRIP: ABNORMAL
KETONES UR STRIP-MCNC: NEGATIVE MG/DL
LEUKOCYTE ESTERASE UR QL STRIP: NEGATIVE
MCH RBC QN AUTO: 30.4 PG (ref 26.5–33)
MCHC RBC AUTO-ENTMCNC: 33.8 G/DL (ref 31.5–36.5)
MCV RBC AUTO: 90 FL (ref 78–100)
MUCOUS THREADS #/AREA URNS LPF: PRESENT /LPF
NITRATE UR QL: NEGATIVE
PH UR STRIP: 6 [PH] (ref 5–7)
PLATELET # BLD AUTO: 296 10E3/UL (ref 150–450)
POTASSIUM SERPL-SCNC: 4.4 MMOL/L (ref 3.4–5.3)
RBC # BLD AUTO: 5.69 10E6/UL (ref 4.4–5.9)
RBC URINE: >182 /HPF
SODIUM SERPL-SCNC: 137 MMOL/L (ref 135–145)
SP GR UR STRIP: 1.02 (ref 1–1.03)
UROBILINOGEN UR STRIP-MCNC: NORMAL MG/DL
WBC # BLD AUTO: 6.7 10E3/UL (ref 4–11)
WBC URINE: <1 /HPF

## 2025-04-18 PROCEDURE — 99285 EMERGENCY DEPT VISIT HI MDM: CPT | Mod: 25

## 2025-04-18 PROCEDURE — 258N000003 HC RX IP 258 OP 636: Performed by: EMERGENCY MEDICINE

## 2025-04-18 PROCEDURE — 74176 CT ABD & PELVIS W/O CONTRAST: CPT

## 2025-04-18 PROCEDURE — 85018 HEMOGLOBIN: CPT | Performed by: EMERGENCY MEDICINE

## 2025-04-18 PROCEDURE — 96375 TX/PRO/DX INJ NEW DRUG ADDON: CPT

## 2025-04-18 PROCEDURE — 96360 HYDRATION IV INFUSION INIT: CPT

## 2025-04-18 PROCEDURE — 81001 URINALYSIS AUTO W/SCOPE: CPT | Performed by: EMERGENCY MEDICINE

## 2025-04-18 PROCEDURE — 36415 COLL VENOUS BLD VENIPUNCTURE: CPT | Performed by: EMERGENCY MEDICINE

## 2025-04-18 PROCEDURE — 86140 C-REACTIVE PROTEIN: CPT | Performed by: EMERGENCY MEDICINE

## 2025-04-18 PROCEDURE — 80048 BASIC METABOLIC PNL TOTAL CA: CPT | Performed by: EMERGENCY MEDICINE

## 2025-04-18 PROCEDURE — 81003 URINALYSIS AUTO W/O SCOPE: CPT | Performed by: STUDENT IN AN ORGANIZED HEALTH CARE EDUCATION/TRAINING PROGRAM

## 2025-04-18 PROCEDURE — 96374 THER/PROPH/DIAG INJ IV PUSH: CPT | Mod: 59

## 2025-04-18 PROCEDURE — 250N000011 HC RX IP 250 OP 636: Mod: JZ | Performed by: EMERGENCY MEDICINE

## 2025-04-18 RX ORDER — ACETAMINOPHEN 500 MG
1000 TABLET ORAL EVERY 6 HOURS
Qty: 56 TABLET | Refills: 0 | Status: SHIPPED | OUTPATIENT
Start: 2025-04-18 | End: 2025-04-25

## 2025-04-18 RX ORDER — IBUPROFEN 200 MG
400 TABLET ORAL EVERY 6 HOURS
Qty: 56 TABLET | Refills: 0 | Status: SHIPPED | OUTPATIENT
Start: 2025-04-18 | End: 2025-04-25

## 2025-04-18 RX ORDER — TAMSULOSIN HYDROCHLORIDE 0.4 MG/1
0.4 CAPSULE ORAL DAILY
Qty: 5 CAPSULE | Refills: 0 | Status: SHIPPED | OUTPATIENT
Start: 2025-04-18 | End: 2025-04-23

## 2025-04-18 RX ORDER — KETOROLAC TROMETHAMINE 15 MG/ML
15 INJECTION, SOLUTION INTRAMUSCULAR; INTRAVENOUS ONCE
Status: COMPLETED | OUTPATIENT
Start: 2025-04-18 | End: 2025-04-18

## 2025-04-18 RX ORDER — ONDANSETRON 2 MG/ML
4 INJECTION INTRAMUSCULAR; INTRAVENOUS ONCE
Status: COMPLETED | OUTPATIENT
Start: 2025-04-18 | End: 2025-04-18

## 2025-04-18 RX ORDER — OXYCODONE HYDROCHLORIDE 5 MG/1
5 TABLET ORAL EVERY 4 HOURS PRN
Qty: 12 TABLET | Refills: 0 | Status: SHIPPED | OUTPATIENT
Start: 2025-04-18 | End: 2025-04-22

## 2025-04-18 RX ORDER — DIMENHYDRINATE 50 MG
50 TABLET ORAL EVERY 6 HOURS PRN
Qty: 28 TABLET | Refills: 0 | Status: SHIPPED | OUTPATIENT
Start: 2025-04-18 | End: 2025-04-25

## 2025-04-18 RX ORDER — DIMENHYDRINATE 50 MG
50 TABLET ORAL AT BEDTIME
Qty: 7 TABLET | Refills: 0 | Status: SHIPPED | OUTPATIENT
Start: 2025-04-18 | End: 2025-04-25

## 2025-04-18 RX ADMIN — KETOROLAC TROMETHAMINE 15 MG: 15 INJECTION, SOLUTION INTRAMUSCULAR; INTRAVENOUS at 22:00

## 2025-04-18 RX ADMIN — SODIUM CHLORIDE 1000 ML: 0.9 INJECTION, SOLUTION INTRAVENOUS at 22:00

## 2025-04-18 RX ADMIN — ONDANSETRON 4 MG: 2 INJECTION, SOLUTION INTRAMUSCULAR; INTRAVENOUS at 22:00

## 2025-04-18 ASSESSMENT — COLUMBIA-SUICIDE SEVERITY RATING SCALE - C-SSRS
2. HAVE YOU ACTUALLY HAD ANY THOUGHTS OF KILLING YOURSELF IN THE PAST MONTH?: NO
1. IN THE PAST MONTH, HAVE YOU WISHED YOU WERE DEAD OR WISHED YOU COULD GO TO SLEEP AND NOT WAKE UP?: NO
6. HAVE YOU EVER DONE ANYTHING, STARTED TO DO ANYTHING, OR PREPARED TO DO ANYTHING TO END YOUR LIFE?: NO

## 2025-04-18 ASSESSMENT — ACTIVITIES OF DAILY LIVING (ADL)
ADLS_ACUITY_SCORE: 41
ADLS_ACUITY_SCORE: 41

## 2025-04-19 NOTE — ED TRIAGE NOTES
History of kidney stones.  Pt states that he was seen 3 months ago and they said he had a stone.  Pt states that the pain started this morning.  Pt states that he is having the same symptoms that he had with the last kidney stone.       Triage Assessment (Adult)       Row Name 04/18/25 2025          Triage Assessment    Airway WDL WDL        Respiratory WDL    Respiratory WDL WDL        Skin Circulation/Temperature WDL    Skin Circulation/Temperature WDL WDL        Peripheral/Neurovascular WDL    Peripheral Neurovascular WDL WDL        Cognitive/Neuro/Behavioral WDL    Cognitive/Neuro/Behavioral WDL WDL

## 2025-04-19 NOTE — ED PROVIDER NOTES
EMERGENCY DEPARTMENT ENCOUNTER      NAME: Carson Cook  AGE: 50 year old male  YOB: 1975  EVALUATION DATE & TIME: 4/18/2025  9:06 PM    ED PROVIDER: Susanna Valentine MD    Chief Complaint   Patient presents with    Flank Pain       FINAL IMPRESSION  1. Ureterolithiasis    2. Right flank pain        MEDICAL DECISION MAKING   Carson oCok is a 50 year old male who presents for evaluation of flank pain.  Records reviewed.  Patient has a history of hypertension and ureteral lithiasis.  He was seen here in the ED on 1/19/2025 with complaints of right flank pain.  He was found to have a 2 mm stone on the right side within the kidney but no other acute findings on CT scan.  Urine did not appear infected and he was treated with antibiotics.  Today, he presents with complaints of left-sided flank pain.  He reports that he has a history of kidney stones and had similar symptoms with those in the past.  He did notice some blood in his urine earlier today but otherwise, has not had any associated nausea, vomiting, fever, diarrhea, or other new symptoms.  Vitals on arrival notable for elevated blood pressure but otherwise stable.    I considered a broad differential including but not limited to nephrolithiasis/ureterolithiasis, renal colic, pyelonephritis, UTI, hydronephrosis, thoracic or abdominal wall strain, MP, electrolyte derangement, diverticulitis, colitis. I have low suspicion for AAA/dissection or other cardiovascular process given history and exam. Discussed options for workup and management. We agreed on plan for labs, UA, CT abdomen/pelvis, IV fluids, and management of symptoms with IV analgesic and antiemetic.  T    ED Course as of 04/20/25 0236   Fri Apr 18, 2025 2256 CT Abdomen Pelvis w/o Contrast  2 mm stone distal left ureter at the UVJ with mild left-sided hydronephrosis. I suspect this to be etiology of symptoms.    2257 UA with Microscopic reflex to Culture(!)  UA with blood, consistent with stone. No  evidence of infection.   2257 CBC (+ platelets, no diff)  CBC reassuring. No evidence of leukocytosis to suggest systemic infectious/inflammatory process. No acute anemia. PLTs wnl.    2257 Basic metabolic panel(!)  BMP reassuring. No evidence of MP, acidosis, or significant electrolyte derangement.    2257 CRP Inflammation: <3.00  CRP negative, lower suspicion for acute infectious/inflammatory process.       Workup today notable for the above.  I rechecked the patient multiple times and reviewed results.  He had improvement in symptoms after initial interventions and was able to rest comfortably.  I explained that I do believe his symptoms are related to the stone identified on CT scan.  He is tolerating p.o. and reports that his pain is very well-controlled so was comfortable with plan for discharge and follow-up with [facility].  Will plan to send him with prescriptions for analgesics, Flomax, and antiemetic as well as referral and a strainer.    At the end of the encounter, we reviewed the results, potential diagnoses, as well as return precautions and recommendations for follow up. I instructed Mr. Cook to return to the emergency department immediately if he develops any new or worsening symptoms and provided additional verbal discharge instructions. Mr. Cook expressed understanding and agreement with this plan of care, his questions were answered, and he was discharged in stable condition.      Additional Considerations in MDM  History:  Supplemental history from: N/A  External Record(s) reviewed: 01/19/2025 - ED visit for UTI and kidney stone.    Work Up:  Chart documentation includes differential diagnoses considered and any EKGs or imaging independently interpreted as specified above.   In additional to work up documented, I considered additional advanced imaging and laboratory workup but deferred after shared decision making conversation with patient/family    External Consultation(s):  Discussion of management  with another provider as documented above and in ED course     Chronic Illness(es):  Care impacted by chronic illness(es):  N/A    Disposition considerations: Discharge. I prescribed additional prescription strength medication(s) as charted. I considered admission, but discharged patient after significant clinical improvement.    MIPS: Not Applicable     Sepsis/STEMI/Stroke: None      ED COURSE  9:16 PM I met with the patient to obtain patient history and performed a physical exam. Discussed plan for ED work up including potential diagnostic studies and interventions.  10:20 PM I rechecked the patient  11:13 PM Reassessed and updated patient with findings.      MEDICATIONS GIVEN IN THE ED  Medications   sodium chloride 0.9% BOLUS 1,000 mL (0 mLs Intravenous Stopped 4/18/25 2306)   ketorolac (TORADOL) injection 15 mg (15 mg Intravenous $Given 4/18/25 2200)   ondansetron (ZOFRAN) injection 4 mg (4 mg Intravenous $Given 4/18/25 2200)       NEW PRESCRIPTIONS STARTED AT TODAY'S VISIT  Discharge Medication List as of 4/18/2025 11:30 PM        START taking these medications    Details   acetaminophen (TYLENOL) 500 MG tablet Take 2 tablets (1,000 mg) by mouth every 6 hours for 7 days., Disp-56 tablet, R-0, E-Prescribe      !! dimenhyDRINATE (DRAMAMINE) 50 MG tablet Take 1 tablet (50 mg) by mouth at bedtime for 7 days., Disp-7 tablet, R-0, E-Prescribe      !! dimenhyDRINATE (DRAMAMINE) 50 MG tablet Take 1 tablet (50 mg) by mouth every 6 hours as needed for other (kidney stone pain management)., Disp-28 tablet, R-0, E-Prescribe      ibuprofen (ADVIL/MOTRIN) 200 MG tablet Take 2 tablets (400 mg) by mouth every 6 hours for 7 days., Disp-56 tablet, R-0, E-Prescribe      oxyCODONE (ROXICODONE) 5 MG tablet Take 1 tablet (5 mg) by mouth every 4 hours as needed for severe pain. If pain is not improved with acetaminophen and ibuprofen., Disp-12 tablet, R-0, Local Print      tamsulosin (FLOMAX) 0.4 MG capsule Take 1 capsule (0.4 mg)  by mouth daily for 5 days., Disp-5 capsule, R-0, E-Prescribe       !! - Potential duplicate medications found. Please discuss with provider.             =================================================================    HPI:    Use of : N/A      Carson Cook is a 50 year old male who presents with flank pain. Patient reports left sided flank pain since this morning. He endorses hematuria. He has a history of kidney stones in which the stones passed on its own. Patient denies nausea, vomiting, and diarrhea.      RELEVANT HISTORY, MEDICATIONS, & ALLERGIES   Past medical history, surgical history, family history, medications, and allergies reviewed and pertinent noted in HPI.    REVIEW OF SYSTEMS:  A complete review of systems was performed with pertinent positives and negatives noted in the HPI.     PHYSICAL EXAM:    Vitals: BP (!) 159/97   Pulse 71   Temp 98.1  F (36.7  C) (Oral)   Resp 16   Wt 70.3 kg (154 lb 14.4 oz)   SpO2 97%   BMI 27.44 kg/m     General: Alert and interactive, comfortable appearing.  HENT: Atraumatic. Full AROM of neck. MMM.  Cardiovascular: Regular rate and rhythm.   Chest/Pulmonary: Normal work of breathing. Speaking in complete sentences. Lungs CTAB. No chest wall tenderness or deformities.  Abdomen: Soft, nondistended. Nontender without guarding or rebound.  Extremities: Normal AROM of all major joints.  Skin: Warm and dry. Normal skin color.   Neuro: Speech clear. CNs grossly intact. Moves all extremities spontaneously.   Psych: Normal affect/mood, cooperative, memory appropriate.      LAB  Labs Ordered and Resulted from Time of ED Arrival to Time of ED Departure   ROUTINE UA WITH MICROSCOPIC REFLEX TO CULTURE - Abnormal       Result Value    Color Urine Yellow      Appearance Urine Turbid (*)     Glucose Urine Negative      Bilirubin Urine Negative      Ketones Urine Negative      Specific Gravity Urine 1.020      Blood Urine >1.0 mg/dL (*)     pH Urine 6.0      Protein  Albumin Urine 30 (*)     Urobilinogen Urine Normal      Nitrite Urine Negative      Leukocyte Esterase Urine Negative      Mucus Urine Present (*)     RBC Urine >182 (*)     WBC Urine <1     BASIC METABOLIC PANEL - Abnormal    Sodium 137      Potassium 4.4      Chloride 100      Carbon Dioxide (CO2) 28      Anion Gap 9      Urea Nitrogen 16.6      Creatinine 0.82      GFR Estimate >90      Calcium 8.5 (*)     Glucose 147 (*)    CRP INFLAMMATION - Normal    CRP Inflammation <3.00     CBC WITH PLATELETS - Normal    WBC Count 6.7      RBC Count 5.69      Hemoglobin 17.3      Hematocrit 51.2      MCV 90      MCH 30.4      MCHC 33.8      RDW 12.8      Platelet Count 296         RADIOLOGY  CT Abdomen Pelvis w/o Contrast   Final Result   IMPRESSION:    1.  2 mm stone distal left ureter at the UVJ with mild left-sided hydronephrosis. Tiny punctate nonobstructing stone right kidney.   2.  Normal retrocecal appendix. No appendicitis.   3.  Stable lipid rich left adrenal adenoma.                    I, Meseret Matias, am serving as a scribe to document services personally performed by Dr. Susanna Valentine based on my observation and the provider's statements to me. I, Susanna Valentine MD attest that Meseret Matias is acting in a scribe capacity, has observed my performance of the services and has documented them in accordance with my direction.    Susanna Valentine M.D.  Emergency Medicine  [facility] EMERGENCY DEPARTMENT  [address] [place] [id number] [phone]  Dept: [phone]     Susanna Valentine MD  04/20/25 0236

## 2025-04-19 NOTE — DISCHARGE INSTRUCTIONS
You were seen in the Emergency Department today for flank pain. As we discussed, your CT scan showed that you have a stone in your ureter. It is painful as the stone travels down from your kidney toward your bladder. Your urine did not look infected.      You are being sent with prescriptions for medications to help with recurrent symptoms. Please take as directed. You are also being sent with a strainer to use when you urinate so you can try to catch the stone.      Please return to the ER if you experience fever, worsening pain, inability to keep food/fluids down, and/or for any other new or concerning symptoms, otherwise please follow up with your primary doctor and urologist for recheck. You were given a referral to the urology clinic/stone specialists and someone should call your from their clinic to schedule an appointment.    Below is some information you might find useful.     Thank you for choosing [facility]. It was a pleasure taking care of you!  - Dr. Susanna Valentine        Recommended Steps to Success after Emergency Department Visit from the Kidney Specialists    Our experience has shown that early clinical contact with the urology can significantly improve patient experience with a kidney stone.    The goals of seeing a kidney stone specialist are to minimize life impact of stone disease on patients by:  Helping patients pass small stones   Promptly recognizing situations where stone passage is unlikely   Designing strategies for long-term prevention of recurrence    The urology team works closely with our patients during acute stone episodes to keep them well informed, safe and as comfortable as possible. We recognize that these stone events are a major interruption to your life. It may take up to a month for an event to resolve but our goal is that you should be able to maintain your usual activities including driving and going to work during this period.    The urology team will try to call you  the next day or next business day after discharge to schedule follow-up care.    SIX STEPS TO CONTROLLING YOUR STONE EPISODE UNTIL SEEN IN CLINIC  DRINK TO THIRST - YOU CANNOT FLUSH OUT A STONE  TAKE THESE MEDICATIONS WHETHER YOU HAVE PAIN OR NOT:  Ibuprofen (Advil or Motrin) Take 2 (200mg) tablets every 6 hours until the stone passes  Dramamine (drowsy version, non-generic formulation) Take 50mg at bedtime every night until the stone passes. In addition, take every 6 hours as needed  Acetaminophen (Tylenol) Take 2 (500mg) tablets every 6 hours until stone passes  Use Ondansetron (Zofran) As prescribed for nausea  ONLY USE NARCOTIC PAIN KILLERS FOR SEVERE PAIN  IF YOU DEVELOP A FEVER, CALL THE CLINIC OR RETURN TO THE ER IMMEDIATELY    If pain worsens or nausea/vomiting uncontrolled with medications, STOP eating & drinking. Call the Clinic immediately. Prior to surgery, you need to have an empty stomach for 8 hours.  [facility] can respond to your questions or concerns 24 hours a day at [phone].    What to Watch For:  FEVER is NEVER normal   Severe Nausea   Uncontrolled pain     Signs and Symptoms You Might Experience  Nausea  Decreased appetite  Urinary frequency  Bloody urine   Chills  Fatigue    Steps you can take to increase chances for passing your stone:    Drink to Thirst:  Do not attempt to  flush out  your stone by drinking too much fluid.  This does not work and may increase nausea.  Drink enough to satisfy your body s thirst.  Eating your normal diet is fine    Strain all Urine:  If you pass the stone, please try and save it if possible. Place it in the container we have provided and bring it to the [facility] clinic or any [facility] lab ([facility] or [facility]) within a week of passing it.    Medications: (that may be suggested or prescribed):    Ibuprofen (Advil or Motrin) has shown to be the most effective for the treatment of stone pain  Acetaminophen (Tylenol) is equally  effective as narcotics for treating stone pain without the major side effects of narcotics    Ibuprofen (Advil or Motrin)   Take 2 (200mg) tablets every 6 hours until the stone passes.  Decreases pain  Prevents spasm of the ureter  Available over the counter      Dramamine (drowsy version, non-generic formulation)  Take 50mg at bedtime every night until the stone passes  In addition, take 50mg every 6 hours as needed  Decreases spasm of the ureter  Decreases recurrence of pain for next 24 hours  Decreases acute pain  Decreases nausea  Will help you sleep  Available over the counter        *This medication will cause increased drowsiness, do not drive or operate machinery for 6 hours    Acetaminophen (Tylenol)   Take 2 (500mg) tablets every 6 hours until stone passes  Do not exceed 4000mg in 24 hours  Highly effective in controlling pain  Available over the counter    Narcotics (Oxycodone or Dilaudid)   Take 1-2 tablets every 4 hours as needed  Take ONLY for severe pain unrelieved by ibuprofen (Advil or Motrin), Dramamine, or Acetaminophen (Tylenol)  Narcotics have major side effects:  Confusion, disorientation and sedation - DO NOT DRIVE OR OPERATE MACHINERY WITHIN 24 HOURS  Nausea - take Dramamine or Zofran  or Haldol to help control  May cause constipation, start over the counter Miralax if needed to treat this  Sleep disturbances    Ondansetron (Zofran)  Take as prescribed  Reserve for severe nausea      Natural History of Stone Passage  Crystals can form if chemicals are too concentrated in your urine.  If the crystal grows over time, a stone may form.  A stone usually is not painful while it is still in the kidney.  As the stone begins to leave the kidney, you may experience episodes of flank pain as the kidney stone approaches the entrance to the ureter. Some people feel a vague ache in the side.  Kidney stones may fall into the ureter.  Some stones are tiny and pass through without causing symptoms. The  ureter is a small tube (approx 1/8 of an inch wide). A kidney stone can get stuck and block the ureter.  If this happens, urine backs up and flows back to the kidney. Back pressure on the kidney can cause:  Severe flank pain radiating to the groin  Severe nausea and vomiting   The pain can occur in the lower back, side, groin or all three.    The probability of a stone passing all the way down the ureter is related to its size (small is good) and how far it has traveled down the ureter at time of recognition (further is better).  When the stone reaches the lower ureter, this can irritate the bladder and sensations of feeling the urge to urinate frequently and urgently may occur.    Once the stone passes out of your ureter and into your bladder, the symptoms of urgency and frequency will often disappear.  Sometimes pain will come back for a short period and will not be as severe as before. The passage of the stone from your bladder and out of your body is usually not a problem. The urethra is at least twice as wide as the normal ureter, so the stone doesn t usually block it.

## 2025-04-19 NOTE — ED NOTES
Urine strainer provided to the pt with discharge instructions. He denies questions regarding the instructions.

## 2025-04-21 ENCOUNTER — DOCUMENTATION ONLY (OUTPATIENT)
Dept: OTHER | Facility: CLINIC | Age: 50
End: 2025-04-21
Payer: COMMERCIAL